# Patient Record
Sex: FEMALE | Race: BLACK OR AFRICAN AMERICAN | Employment: FULL TIME | URBAN - METROPOLITAN AREA
[De-identification: names, ages, dates, MRNs, and addresses within clinical notes are randomized per-mention and may not be internally consistent; named-entity substitution may affect disease eponyms.]

---

## 2017-07-25 ENCOUNTER — HOSPITAL ENCOUNTER (OUTPATIENT)
Dept: MAMMOGRAPHY | Age: 62
Discharge: HOME | End: 2017-07-25
Attending: NURSE PRACTITIONER
Payer: MEDICAID

## 2017-07-25 DIAGNOSIS — Z12.31 VISIT FOR SCREENING MAMMOGRAM: ICD-10-CM

## 2017-07-25 PROCEDURE — 77067 SCR MAMMO BI INCL CAD: CPT

## 2018-07-27 ENCOUNTER — HOSPITAL ENCOUNTER (OUTPATIENT)
Dept: MAMMOGRAPHY | Age: 63
Discharge: HOME | End: 2018-07-27
Attending: INTERNAL MEDICINE
Payer: MEDICAID

## 2018-07-27 DIAGNOSIS — Z12.39 SCREENING FOR BREAST CANCER: ICD-10-CM

## 2018-07-27 PROCEDURE — 77067 SCR MAMMO BI INCL CAD: CPT

## 2019-07-29 ENCOUNTER — HOSPITAL ENCOUNTER (OUTPATIENT)
Dept: MAMMOGRAPHY | Age: 64
Discharge: HOME | End: 2019-07-29
Attending: INTERNAL MEDICINE
Payer: MEDICAID

## 2019-07-29 DIAGNOSIS — Z12.31 VISIT FOR SCREENING MAMMOGRAM: ICD-10-CM

## 2019-07-29 PROCEDURE — 77067 SCR MAMMO BI INCL CAD: CPT

## 2019-08-14 ENCOUNTER — HOSPITAL ENCOUNTER (OUTPATIENT)
Dept: ULTRASOUND IMAGING | Age: 64
Discharge: HOME | End: 2019-08-14
Attending: INTERNAL MEDICINE
Payer: MEDICAID

## 2019-08-14 ENCOUNTER — HOSPITAL ENCOUNTER (OUTPATIENT)
Dept: MAMMOGRAPHY | Age: 64
Discharge: HOME | End: 2019-08-14
Attending: INTERNAL MEDICINE
Payer: MEDICAID

## 2019-08-14 DIAGNOSIS — R92.2 INCONCLUSIVE MAMMOGRAM: ICD-10-CM

## 2019-08-14 DIAGNOSIS — R92.8 ABNORMALITY OF RIGHT BREAST ON SCREENING MAMMOGRAM: ICD-10-CM

## 2019-08-14 PROCEDURE — 76642 ULTRASOUND BREAST LIMITED: CPT

## 2019-08-14 PROCEDURE — 77061 BREAST TOMOSYNTHESIS UNI: CPT

## 2020-07-31 ENCOUNTER — HOSPITAL ENCOUNTER (OUTPATIENT)
Dept: MAMMOGRAPHY | Age: 65
Discharge: HOME | End: 2020-07-31
Payer: MEDICAID

## 2020-07-31 DIAGNOSIS — Z12.31 ENCOUNTER FOR SCREENING MAMMOGRAM FOR BREAST CANCER: ICD-10-CM

## 2020-07-31 PROCEDURE — 77063 BREAST TOMOSYNTHESIS BI: CPT

## 2020-08-17 ENCOUNTER — HOSPITAL ENCOUNTER (OUTPATIENT)
Dept: GENERAL RADIOLOGY | Age: 65
Discharge: HOME | End: 2020-08-17
Payer: MEDICAID

## 2020-08-17 DIAGNOSIS — R05.8 PRODUCTIVE COUGH: ICD-10-CM

## 2020-08-17 PROCEDURE — 71046 X-RAY EXAM CHEST 2 VIEWS: CPT

## 2020-09-28 ENCOUNTER — TRANSCRIBE ORDER (OUTPATIENT)
Dept: SCHEDULING | Age: 65
End: 2020-09-28

## 2020-09-28 DIAGNOSIS — M81.0 SENILE OSTEOPOROSIS: Primary | ICD-10-CM

## 2020-10-08 ENCOUNTER — HOSPITAL ENCOUNTER (OUTPATIENT)
Dept: GENERAL RADIOLOGY | Age: 65
Discharge: HOME | End: 2020-10-08
Attending: NURSE PRACTITIONER
Payer: MEDICAID

## 2020-10-08 DIAGNOSIS — M81.0 SENILE OSTEOPOROSIS: ICD-10-CM

## 2020-10-08 PROCEDURE — 77080 DXA BONE DENSITY AXIAL: CPT

## 2021-08-17 ENCOUNTER — HOSPITAL ENCOUNTER (OUTPATIENT)
Dept: WOMENS IMAGING | Age: 66
Discharge: HOME | End: 2021-08-17
Attending: NURSE PRACTITIONER
Payer: MEDICAID

## 2021-08-17 DIAGNOSIS — Z12.31 VISIT FOR SCREENING MAMMOGRAM: ICD-10-CM

## 2021-08-17 PROCEDURE — 77063 BREAST TOMOSYNTHESIS BI: CPT

## 2022-01-07 ENCOUNTER — HOSPITAL ENCOUNTER (OUTPATIENT)
Dept: PHYSICAL THERAPY | Age: 67
Discharge: HOME | End: 2022-01-07
Payer: MEDICARE

## 2022-01-07 PROCEDURE — 97162 PT EVAL MOD COMPLEX 30 MIN: CPT

## 2022-01-07 NOTE — PROGRESS NOTES
[address] PHYSICAL THERAPY  [address] [facility] [address], [place], [address] - Phone: [phone]  Fax: [phone] / [address] [facility]  Patient Name: Arsalan Mora : 1955   Medical   Diagnosis: Other low back pain [M54.59] Treatment Diagnosis: Lumbar Radiculopathy   Onset Date: >6 months ago     Referral Source: Janey Saunders MD Start of Care [facility]): 2022   Prior Hospitalization: See medical history Provider #: [id number]   Prior Level of Function: Functionally independent and pain free   Comorbidities: HTN, overweight   Medications: Verified on Patient Summary List   The Plan of Care and following information is based on the information from the initial evaluation.   ===========================================================================================  Assessment / key information: Patient is a 77 y.o. female presenting to clinic with CC left sided LBP that intermittently radiates tingling/burning sensation to her left LE. Symptoms were insidous onset in nature, and she now presents with postural deformity upon rise. She demonstrates a FTSS score of 22 seconds, indicating increased risk for falls. LQS unremarkable, though MDT screening demonstrates ability to centralize all symptoms. The patient will benefit from physical therapist management to address her impairments (listed below),  educate her, and improve her level of function.  Thanks for your referral.   ===========================================================================================  Eval Complexity: History: MEDIUM  Complexity : 1-2 comorbidities / personal factors will impact the outcome/ POC Exam:MEDIUM Complexity : 3 Standardized tests and measures addressing body structure, function, activity limitation and / or participation in recreation  Presentation: MEDIUM Complexity : Evolving with changing characteristics  Clinical Decision Making:MEDIUM Complexity : FOTO score of 26-74Overall Complexity:MEDIUM  Problem List: pain affecting function, decrease ROM, decrease strength, impaired gait/ balance, decrease ADL/ functional abilitiies, decrease activity tolerance, decrease flexibility/ joint mobility and decrease transfer abilities  FOTO score: 56 indicating 44% functional disability   Treatment Plan may include any combination of the following: Therapeutic exercise, Therapeutic activities, Neuromuscular re-education, Physical agent/modality, Gait/balance training, Manual therapy, Aquatic therapy, Patient education, Self Care training, Functional mobility training, Home safety training and Stair training  Patient / Family readiness to learn indicated by: asking questions, trying to perform skills and interest  Persons(s) to be included in education: patient (P)  Barriers to Learning/Limitations: None  Measures taken: n/a   Patient Goal (s): \"Less Pain\"   Patient self reported health status: good  Rehabilitation Potential: excellent   Short Term Goals: To be accomplished in  3  weeks:  1. Patient to be adherent to HEP to facilitate pain control with ADL's  2. Patient to centralize left LE sx to level of L/S to demonstrate effectiveness of directional preference exercises and decrease risk of LE dysfunction  3. Patient to report > 50% improvement in sleep interrupted by LBP. 4. Patient to demonstrate no postural deformity upon rise.  Long Term Goals: To be accomplished in  6  weeks:  1. Patient to be Safe and Independent with HEP to self-manage/prevent symptoms after DC. 2. Patient to increase FS FOTO score to > 62 to indicate increased functional independence. 3. Patient to demonstrate FTSS score of < 18\" to indicate reduced risk for falls. Frequency / Duration:   Patient to be seen  2  times per week for 6  weeks:  Patient / Caregiver education and instruction: activity modification and exercises. We reviewed our facility's Patient Personal Responsibilities (PPR) form, particularly in regards to compliance towards her appointment time, our attendance policy, and her home exercise program. Patient was informed of possible discharge for non-compliance to our attendance policy per PPR form. We also discussed her POC as deemed appropriate by the treating therapist and physician. Patient verbalized understanding that she must show objective and functional improvement in an appropriate time frame. Patient verbalized understanding that should progress or compliance be lacking, we will contact the referring physician for further consultation to address and attempt to establish alternate treatment strategies as necessary and/or possibly discharge. Therapist Signature: Naman Sepulveda \"BJ\" Bello Baez, TATIANAT, Cert. MDT, Cert. DN, Cert. SMT, Dip. Osteopractic Date: 6/2/2557   Certification Period: n/a Time: 10:56 AM   ===========================================================================================  I certify that the above Physical Therapy Services are being furnished while the patient is under my care. I agree with the treatment plan and certify that this therapy is necessary. Physician Signature:        Date:       Time:                                        Colletta Conners, MD  Please sign and return to [employer] or you may fax the signed copy to [phone]. Thank you.

## 2022-01-07 NOTE — PROGRESS NOTES
PHYSICAL THERAPY - DAILY TREATMENT NOTE  Patient Name: Lee Zafar        Date: 2022  : 1955   [x]  Patient  Verified  Visit #:   1     Insurance: Payor: [employer] MEDICAID / Plan: [facility] HE[employer]KEEPERS PLUS / Product Type: Managed Care Medicaid /      In time:   10:00          Out time:   10:45 Total Treatment Time (min):   45   Medicare Time Tracking (below)   Total Timed Codes (min):  0 1:1 Treatment Time:  0     SUBJECTIVE    Pain Level (on 0 to 10 scale):  5  / 10   Medication Changes/New allergies or changes in medical history, any new surgeries or procedures? []  No    []  Yes   If yes, update Summary List:    Subjective Functional Status/Changes:  []  No changes reported       HISTORY    Present Symptoms: LBP, Left side.  Has experienced tingling to left thigh    Present since:  >6 months  [] Improving []  Unchanging [x]  Worsening        Commenced as a result of:    or  [x]  No apparent reason  Started as a spasm, then became tingling, now a burning sensation    Symptoms at onset:  [x] Back []  Thigh []  Leg     Constant symptoms:  [x] Back []  Thigh []  Leg       Intermittent symptoms:  [] Back [x]  Thigh []  Leg   Itching sensation    Worse:  [x] Bending []  Walking []  Lying   [x] Sitting/ Rising [x]  Standing [] When still   []  Am/ as the day progresses/ pm []  On the move []Other:     Other:      Better:   [] Bending [x]  Walking []  Lying   [] Sitting/ Rising []  Standing [] When still   []  Am/ as the day progresses/ pm [x]  On the move []Other:       Other:    Disturbed sleep: [] No    [x] Yes       Sleeping Postures:  []  Prone [x]  Supine   []  R side [x] L side    [] Toss and Turn [] OOB             Current Treatment: motrin    Number of previous episodes: none      Previous Pertinent Medical History: HTN    Previous Treatments: medication    SPECIFIC QUESTIONS    [] Cough []  Deep breath [x]  -ve   [] Sneeze []  + ve      Bladder:  [x] Normal []  Abnormal Gait:  [] Normal [x]  Abnormal     General Health:  [] Good [x]  Fair []  Poor     Imaging:   [] Yes [x]  No       Results if yes:n/a    Night pain:   [] Yes [x]  No     Recent or major surgery:  [] Yes [x]  No     Accidents/Falls:  [] Yes [x]  No     Unexplained weight loss:  [] Yes [x]  No     Work:  Mechanical Stresses: sitting    Leisure: Mechanical Stresses: sedentary     Functional disability from present episode: postural deformity upon rise, limited ADL tolreance     Functional disability score- See FS FOTO score       OBJECTIVE  EXAMINATION  Posture:  Sitting  [] Good []  Fair [x]  Poor     Standing:  [] Good [x]  Fair []  Poor     Lordosis:  [] Red []  Acc []  Normal       Lateral shift:  [] Right []  Left [x]  Nil     Correction of posture:  [x] Better [] Worse []  No effect     Other observations:      Neurological:    Myotome Level Muscles Nerve Reflex Sensation Action   L1-L3 Iliopsoas T12/L1-3  Quadriceps L2-4  Adductors L2-L4 (Iliacus)- Femoral  Femoral  Obturator/Sciatic N/A  L3-4 = Patella L1- Inguinal Crease  L2- Anterior Thigh  L3- Anterior Thigh above knee Hip Flexion  Knee Extension   L4 Tibialis anterior L4&5   Deep Peroneal Patella Anterior Knee Suprapatellar Ankle Dorsiflexion   L5 Extensor Hallucis Longus  Extensor Digitorum Lungus  Gluteus Medius Deep Peroneal         Superior Gluteal None Reliable Dorsum of Foot/Great Toe  Anterior Shank Extensor  to Great Toe        Hip Internal Rotation   S1 Peroneus Longus  Gastrocnemius & Soleus  Gluteus Ronnell Superficial Peroneal  Tibial    Inferior Gluteal Achilles Tendon Lateral Shank around Lateral Malleolus  Lateral Aspect/Dorsum of GT   Plantar Flexion      Hip Extension   Notable findings from above: unremarkable  Dural signs: Cushing String:       [x] R  [] +    [] -    [] L    [] +    [] -      ASLR:              [x] R  [] +    [] -    [x] L    [] +    [] -  0-35= no dural movement, tension onset to sciatic roots at 35 degrees.  Sciatic roots tense over intervertebral discs 35-70 degrees. > 70 degrees practically no further deformation of roots. Bragard's Sign [x] R  [] +    [] -    [x] L    [] +    [] - (Take off flexion, then dorsiflex)      Salazar's Sign   R  [x] +    [] -          [x] L    [] +    [] - (Take off flexion, then have patient flex neck)    Slump test :       [x] R   [] +    [] -    [x] L    [] +    [] - (sensitivity 44-70%; specificity 23-66%)    L/S Movement loss Raji Mod Min Nil Pain   Flexion   x  Left L/S   Extension  x   C   Side Gliding R    x NE   Side Gliding L    x Left L/S       Test movements:  Describe effects on present pain- During: produces, abolishes, increases, decreases, no effect, centralising, peripheralising. After: better, worse, no better, no worse, no effect, centralised, peripheralised.     Able to C with repeated extensions    Other Tests:    [place]:  Gaenslen's: [x] R    [] +    [x] -   [x] L    [] +    [x] -     Compression: [x] R    [] +    [x] -    [x] L    [] +    [x] -      Gapping/Distraction:  [] +    [x] -     Thigh Thrust: [x] R [] +    [x] -    [x] L    [] +    [x] -     Sacral Thrust  [x] R [] +    [x] -    [x] L    [] +    [x] -          Hip: [employer]:  [x] R [] +    [x] -    [x] L    [] +    [][x] -     Scour:  [x] R [] +    [x] -    [x] L    [] +     -     Piriformis: [x] R [] +    [x] -    [x] L    [] +    [] -          Deficits: Hamstrings 90/90[de-identified] [x] R [] +    [x] -    [x] L    [] +    [x] -     Robb:    [x] R  [] +    [x] -   [x] L    [] +    [x] -     Post Treatment Pain Level (on 0 to 10) scale:   3C  / 10     ASSESSMENT      min Patient Education:  YES  Reviewed HEP   []  Progressed/Changed HEP based on:          ASSESSMENT    Assessment  Justification for Eval Code Complexity:  Patient History (low 0, mod 1-2, high 3-4): mod  Examination (low 1-2, mod 3+, high 4+): mod  Clinical Presentation (low stable or uncomplicated, mod evolving or changing, high unstable or unpredictable): mod  Clinical Decision Making (low , mod 26-74, high 1-25): FOTO mod      []  See Progress Note/Recertification   Patient will continue to benefit from skilled PT services to : SEE POC   Progress toward goals / Updated goals:    See POC     PLAN    []  Upgrade activities as tolerated YES Continue plan of care   []  Discharge due to :    [x]  Other: Initiate POC     Therapist: Sherron Bhagat \"BJ\" Alejandro Solorzano, DPT, Cert. MDT, Cert. DN, Cert.  SMT    Date: 1/7/2022 Time: 10:11 AM     Future Appointments   Date Time Provider Syed Brownlee   1/7/2022 10:15 AM Melinda Daniel, PT [facility]

## 2022-01-17 ENCOUNTER — HOSPITAL ENCOUNTER (OUTPATIENT)
Dept: PHYSICAL THERAPY | Age: 67
Discharge: HOME | End: 2022-01-17
Payer: MEDICARE

## 2022-01-17 PROCEDURE — 97110 THERAPEUTIC EXERCISES: CPT

## 2022-01-17 PROCEDURE — 97530 THERAPEUTIC ACTIVITIES: CPT

## 2022-01-17 NOTE — PROGRESS NOTES
PHYSICAL THERAPY - DAILY TREATMENT NOTE    Patient Name: Ruth Ann Long        Date: 2022  : 1955   yes Patient  Verified  Visit #:   2     Insurance: Payor: Katheryn Browne / Plan[place] & [place] / Product Type: Medicare /      In time: 336 Out time: 664   Total Treatment Time: 56     Medicare/BCBS Time Tracking (below)   Total Timed Codes (min):  46 1:1 Treatment Time:  46     TREATMENT AREA =  Other low back pain [M54.59]    SUBJECTIVE  Pain Level (on 0 to 10 scale):  5  / 10   Medication Changes/New allergies or changes in medical history, any new surgeries or procedures?    no  If yes, update Summary List   Subjective Functional Status/Changes:  []  No changes reported     \"I realize that I do need to work on sitting up better\"          OBJECTIVE  Modalities Rationale:     decrease pain to improve patient's ability to safely perform ADLs, bending/stooping/ lifting; perform prolong sitting/standing/ambulation; and negotiate stairs with no pain or limitations    min [] Estim, type/location:                                      []  att     []  unatt     []  w/US     []  w/ice    []  w/heat    min []  Mechanical Traction: type/lbs                   []  pro   []  sup   []  int   []  cont    []  before manual    []  after manual    min []  Ultrasound, settings/location:      min []  Iontophoresis w/ dexamethasone, location:                                               []  take home patch       []  in clinic   10 min []  Ice     [x]  Heat    location/position: Supine with wedge under B LEs   [x]Skin assessment post-treatment (if applicable):   [x]  intact    []  redness- no adverse reaction                  []redness  adverse reaction:      36 min Therapeutic Exercise:  [x]  See flow sheet   Rationale:      increase ROM, increase strength and improve coordination to improve the patients ability to safely perform ADLs, bending/stooping/ lifting; perform prolong sitting/standing/ambulation; and negotiate stairs with no pain or limitations        10 min Therapeutic Activity: [x]  See flow sheet   Rationale:    increase ROM, increase strength and improve coordination to improve the patients ability to  perform transfers and bed mobility without difficulty or pain      Billed With/As:   [x] TE   [x] TA   [] Neuro   [] Self Care Patient Education: [x] Review HEP    [] Progressed/Changed HEP based on:   [x] positioning   [x] body mechanics   [x] transfers   [] heat/ice application    [x] other: Pt ed on importance and benefits of compliance with HEP, core strength/stability and proper posture; pt verbalized understanding    See updated HEP in chart     Other Objective/Functional Measures:    VCs + demo to perform proper technique for TE  Initiated TE per flowsheet;  no c/o pain noted at this time  Reviewed proper bed mobility, sleeping positions, lifting techniques and importance and benefits of a neutral spine  demos decrease RLE WBing with sit <>std without UE, min improvement with VCing due to knee pain     Post Treatment Pain Level (on 0 to 10) scale:   0  / 10     ASSESSMENT  Assessment/Changes in Function:   Progressed TE without c/o increase p!  demos proper log roll>sup>sit with instruction     []  See Progress Note/Recertification   Patient will continue to benefit from skilled PT services to modify and progress therapeutic interventions, address functional mobility deficits, address ROM deficits, address strength deficits, analyze and address soft tissue restrictions, analyze and cue movement patterns, analyze and modify body mechanics/ergonomics, assess and modify postural abnormalities and instruct in home and community integration to attain remaining goals.    Progress toward goals / Updated goals:    Pt's [facility], no noted progress        PLAN  [x]  Upgrade activities as tolerated yes Continue plan of care   []  Discharge due to :    []  Other:      Therapist: Lupe Huang, PTA Date: 1/17/2022 Time: 4:48 PM     Future Appointments   Date Time Provider Syed Brownlee   1/19/2022  3:30 PM [facility]   1/24/2022  3:30 PM Christel Brinks [facility]   1/26/2022  3:30 PM [facility]   1/31/2022  3:30 PM Albert Delatorre PT [facility]

## 2022-01-19 ENCOUNTER — APPOINTMENT (OUTPATIENT)
Dept: PHYSICAL THERAPY | Age: 67
End: 2022-01-19
Payer: MEDICARE

## 2022-01-24 ENCOUNTER — HOSPITAL ENCOUNTER (OUTPATIENT)
Dept: PHYSICAL THERAPY | Age: 67
Discharge: HOME | End: 2022-01-24
Payer: MEDICARE

## 2022-01-24 PROCEDURE — 97112 NEUROMUSCULAR REEDUCATION: CPT

## 2022-01-24 PROCEDURE — 97530 THERAPEUTIC ACTIVITIES: CPT

## 2022-01-24 PROCEDURE — 97116 GAIT TRAINING THERAPY: CPT

## 2022-01-24 PROCEDURE — 97110 THERAPEUTIC EXERCISES: CPT

## 2022-01-24 NOTE — PROGRESS NOTES
PHYSICAL THERAPY - DAILY TREATMENT NOTE    Patient Name: Renetta Jenkins        Date: 2022  : 1955   yes Patient  Verified  Visit #:   3     Insurance: Payor: Rudolph Ceciliaaguila / Plan: [place] PART A & B / Product Type: Medicare /      In time: 601 Out time: 733   Total Treatment Time: 58     Medicare/BCBS Time Tracking (below)   Total Timed Codes (min):  48 1:1 Treatment Time:  48     TREATMENT AREA =  Other low back pain [M54.59]    SUBJECTIVE  Pain Level (on 0 to 10 scale): 4  / 10   Medication Changes/New allergies or changes in medical history, any new surgeries or procedures?    no  If yes, update Summary List   Subjective Functional Status/Changes:  []  No changes reported     \"I am going to be honest, I have not been compliant with my HEP. I am bad. I will.  My son was sick all last week\"         OBJECTIVE  Modalities Rationale:     decrease pain to improve patient's ability to safely perform ADLs, bending/stooping/ lifting; perform prolong sitting/standing/ambulation; and negotiate stairs with no pain or limitations    min [] Estim, type/location:                                      []  att     []  unatt     []  w/US     []  w/ice    []  w/heat    min []  Mechanical Traction: type/lbs                   []  pro   []  sup   []  int   []  cont    []  before manual    []  after manual    min []  Ultrasound, settings/location:      min []  Iontophoresis w/ dexamethasone, location:                                               []  take home patch       []  in clinic   10 min []  Ice     [x]  Heat    location/position: prone with wedge under B LEs   [x]Skin assessment post-treatment (if applicable):   [x]  intact    []  redness- no adverse reaction                  []redness  adverse reaction:      11 min Therapeutic Exercise:  [x]  See flow sheet   Rationale:      increase ROM, increase strength and improve coordination to improve the patients ability to safely perform ADLs, bending/stooping/ lifting; perform prolong sitting/standing/ambulation; and negotiate stairs with no pain or limitations        10 min Therapeutic Activity: [x]  See flow sheet   Rationale:    increase ROM, increase strength and improve coordination to improve the patients ability to  perform transfers, stair training, prolong amb and bed mobility without difficulty or pain    12 min Neuromuscular Re-ed: [x]  See flow sheet   Rationale:    increase ROM, increase strength and improve coordination to improve the patients ability to safely perform ADLs, bending/stooping/ lifting; perform prolong sitting/standing/ambulation; and negotiate stairs with no pain or limitations       15 min Gait Training: CGA > SBA for HK amb x 60'   (I) with suitcase carry x 60' ea UE with 5#   (I) with SS x 30' x 2    Rationale:increase ROM, increase strength and improve coordination to improve the patients ability to safely perform prolong ambulation      Billed With/As:   [x] TE   [x] TA   [] Neuro   [] Self Care Patient Education: [x] Review HEP    [] Progressed/Changed HEP based on:   [x] positioning   [x] body mechanics   [x] transfers   [] heat/ice application    [x] other: Pt ed on importance and benefits of compliance with HEP, core strength/stability and proper posture; pt verbalized understanding  issued OTB for HEP     Other Objective/Functional Measures:    VCs + demo to perform proper technique for TE  Initiated HK and SS amb, and HL hip add/abd;  no c/o pain noted at this time  c/o fatigue after performing sit <>std without UE x 10 without producing pain     Post Treatment Pain Level (on 0 to 10) scale:   0  / 10     ASSESSMENT  Assessment/Changes in Function:   demos symmetrical WBIng with sit <>stand without UE asisst  demos upright posture on DD x 10'', continues to lack core strength   []  See Progress Note/Recertification   Patient will continue to benefit from skilled PT services to modify and progress therapeutic interventions, address functional mobility deficits, address ROM deficits, address strength deficits, analyze and address soft tissue restrictions, analyze and cue movement patterns, analyze and modify body mechanics/ergonomics, assess and modify postural abnormalities and instruct in home and community integration to attain remaining goals. Progress toward goals / Updated goals:  Short Term Goals: To be accomplished in  3  weeks from 1/7/22:  1. Patient to be adherent to HEP to facilitate pain control with ADL's. Established HEP  2. Patient to centralize left LE sx to level of L/S to demonstrate effectiveness of directional preference exercises and decrease risk of LE dysfunction. intermittent sxs  3. Patient to report > 50% improvement in sleep interrupted by LBP. 4. Patient to demonstrate no postural deformity upon rise. · Long Term Goals: To be accomplished in  6  weeks:  1. Patient to be Safe and Independent with HEP to self-manage/prevent symptoms after DC. 2. Patient to increase FS FOTO score to > 62 to indicate increased functional independence. 3. Patient to demonstrate FTSS score of < 18\" to indicate reduced risk for falls.      PLAN  [x]  Upgrade activities as tolerated yes Continue plan of care   []  Discharge due to :    []  Other:      Therapist: Radha Rogers PTA    Date: 1/24/2022 Time: 5:29 PM     Future Appointments   Date Time Provider Syed Brownlee   1/26/2022  3:30 PM Suezanne Province [facility]   1/31/2022  3:30 PM Alejandrina Licona PT [facility]

## 2022-01-26 ENCOUNTER — HOSPITAL ENCOUNTER (OUTPATIENT)
Dept: PHYSICAL THERAPY | Age: 67
Discharge: HOME | End: 2022-01-26
Payer: MEDICARE

## 2022-01-26 PROCEDURE — 97530 THERAPEUTIC ACTIVITIES: CPT

## 2022-01-26 PROCEDURE — 97116 GAIT TRAINING THERAPY: CPT

## 2022-01-26 PROCEDURE — 97112 NEUROMUSCULAR REEDUCATION: CPT

## 2022-01-26 PROCEDURE — 97110 THERAPEUTIC EXERCISES: CPT

## 2022-01-26 NOTE — PROGRESS NOTES
PHYSICAL THERAPY - DAILY TREATMENT NOTE    Patient Name: Niraj Strickland        Date: 2022  : 1955   yes Patient  Verified  Visit #:   3     Insurance: Payor: Mary Kate Beth / Plan: [place] PART [place] & B / Product Type: Medicare /      In time: 771 Out time: 944   Total Treatment Time: 58     Medicare/BCBS Time Tracking (below)   Total Timed Codes (min):  48 1:1 Treatment Time:  48     TREATMENT AREA =  Other low back pain [M54.59]    SUBJECTIVE  Pain Level (on 0 to 10 scale): 4  / 10 L/s, 7/10 B LEs   Medication Changes/New allergies or changes in medical history, any new surgeries or procedures?    no  If yes, update Summary List   Subjective Functional Status/Changes:  []  No changes reported     \"I feel it in my legs today. I work up that way. My back isnt as bad. The legs are bad.  I did my stretches a couple times\"       OBJECTIVE  Modalities Rationale:     decrease pain to improve patient's ability to safely perform ADLs, bending/stooping/ lifting; perform prolong sitting/standing/ambulation; and negotiate stairs with no pain or limitations    min [] Estim, type/location:                                      []  att     []  unatt     []  w/US     []  w/ice    []  w/heat    min []  Mechanical Traction: type/lbs                   []  pro   []  sup   []  int   []  cont    []  before manual    []  after manual    min []  Ultrasound, settings/location:      min []  Iontophoresis w/ dexamethasone, location:                                               []  take home patch       []  in clinic   10 min []  Ice     [x]  Heat    location/position: prone with wedge under B LEs   [x]Skin assessment post-treatment (if applicable):   [x]  intact    []  redness- no adverse reaction                  []redness  adverse reaction:      16  min Therapeutic Exercise:  [x]  See flow sheet   Rationale:      increase ROM, increase strength and improve coordination to improve the patients ability to safely perform ADLs, bending/stooping/ lifting; perform prolong sitting/standing/ambulation; and negotiate stairs with no pain or limitations        10 min Therapeutic Activity: [x]  See flow sheet   Rationale:    increase ROM, increase strength and improve coordination to improve the patients ability to  perform transfers, stair training, prolong amb and bed mobility without difficulty or pain    12 min Neuromuscular Re-ed: [x]  See flow sheet   Rationale:    increase ROM, increase strength and improve coordination to improve the patients ability to safely perform ADLs, bending/stooping/ lifting; perform prolong sitting/standing/ambulation; and negotiate stairs with no pain or limitations       12 min Gait Training: (I) with Front rack carry x 61' with 5#  (I) with suitcase carry x 60' ea UE with 5#   (I) with SS x 30' x 2    Rationale:increase ROM, increase strength and improve coordination to improve the patients ability to safely perform prolong ambulation      Billed With/As:   [x] TE   [x] TA   [] Neuro   [] Self Care Patient Education: [x] Review HEP    [] Progressed/Changed HEP based on:   [x] positioning   [x] body mechanics   [x] transfers   [] heat/ice application    [x] other: Pt ed on importance and benefits of compliance with HEP, core strength/stability and proper posture; pt verbalized understanding  issued OTB for HEP     Other Objective/Functional Measures:    VCs + demo to perform proper technique for TE  Initiated front rack carry, and bridges without producing pain  simulated car transfers with sit <>std without UE to promote proper techn and eliminate twisting  ART-B/B decrease pain and radic sxs and decrease antalgic gait  instructed to perform every 1-2 hr x 10 to manage sxs; pt verbalized understanding     Post Treatment Pain Level (on 0 to 10) scale:  4  / 10     ASSESSMENT  Assessment/Changes in Function:   ART - reduced pain to 4/10 in LEs  demos fair bridge form  achieved STG #4   []  See Progress Note/Recertification   Patient will continue to benefit from skilled PT services to modify and progress therapeutic interventions, address functional mobility deficits, address ROM deficits, address strength deficits, analyze and address soft tissue restrictions, analyze and cue movement patterns, analyze and modify body mechanics/ergonomics, assess and modify postural abnormalities and instruct in home and community integration to attain remaining goals. Progress toward goals / Updated goals:  Short Term Goals: To be accomplished in  3  weeks from 1/7/22:  1. Patient to be adherent to HEP to facilitate pain control with ADL's. Established HEP  2. Patient to centralize left LE sx to level of L/S to demonstrate effectiveness of directional preference exercises and decrease risk of LE dysfunction. intermittent sxs  3. Patient to report > 50% improvement in sleep interrupted by LBP. 4. Patient to demonstrate no postural deformity upon rise. MET  · Long Term Goals: To be accomplished in  6  weeks:  1. Patient to be Safe and Independent with HEP to self-manage/prevent symptoms after DC. 2. Patient to increase FS FOTO score to > 62 to indicate increased functional independence. 3. Patient to demonstrate FTSS score of < 18\" to indicate reduced risk for falls.      PLAN  [x]  Upgrade activities as tolerated yes Continue plan of care   []  Discharge due to :    []  Other:      Therapist: Claudette Paz PTA    Date: 1/26/2022 Time: 5:29 PM     Future Appointments   Date Time Provider Syed Brownlee   1/31/2022  3:30 PM Melinda Daniel PT [facility]

## 2022-01-31 ENCOUNTER — HOSPITAL ENCOUNTER (OUTPATIENT)
Dept: PHYSICAL THERAPY | Age: 67
Discharge: HOME | End: 2022-01-31
Payer: MEDICARE

## 2022-01-31 PROCEDURE — 97110 THERAPEUTIC EXERCISES: CPT

## 2022-01-31 PROCEDURE — 97116 GAIT TRAINING THERAPY: CPT

## 2022-01-31 PROCEDURE — 97112 NEUROMUSCULAR REEDUCATION: CPT

## 2022-01-31 PROCEDURE — 97530 THERAPEUTIC ACTIVITIES: CPT

## 2022-01-31 NOTE — PROGRESS NOTES
PHYSICAL THERAPY - DAILY TREATMENT NOTE    Patient Name: Nola Ricardo        Date: 2022  : 1955   YES Patient  Verified  Visit #:   5     Insurance: Payor: Asad Batista / Plan: [place] MED[place] PART A & B / Product Type: Medicare /      In time: 3:21 Out time: 4:13   Total Treatment Time: 53       TREATMENT AREA = Other low back pain [M54.59]    SUBJECTIVE    Pain Level (on 0 to 10 scale):  5  / 10   Medication Changes/New allergies or changes in medical history, any new surgeries or procedures? NO    If yes, update Summary List   Subjective Functional Status/Changes:  []  No changes reported     \"I got a little in both my legs and back\"          OBJECTIVE     Therapeutic Procedures:  Min Procedure Specifics + Rationale   n/a [x]  Patient Education (performed throughout session) [x] Review HEP    [] Progressed/Changed HEP based on:   [] proper performance and advancement of Therex/TA   [] reduction in pain level    [] increased functional capacity       [] change in directional preference   14 [x] Therapeutic Exercise   [x]  See Flowsheet   Rationale: increase ROM and increase strength to improve the patients ability to participate in ADL's    11 [x]  Gait Training       HK/Retro/SS  Loaded carry with 10lb SC and goblet walk  Rationale: Normalize gait, increase proprioceptive and kinesthetic awareness, coordination, balance   15 [x] Therapeutic Activity   [x]  See Flowsheet    Rationale: To improve safety, proprioception, coordination, and efficiency with tasks   10 [x] Neuromuscular Re-ed   [x]  See Flowsheet  Performed ART/REIL throughout treatment b/w sets/reps/exercises as needed as prophylaxis and symptoms management. Rationale: increase ROM, increase strength, improve coordination, improve balance and increase proprioception  to improve the patients ability to safely participate in ADL's         Other Objective/Functional Measures:    Increased reps/sets/resistance per flow sheet. Post Treatment Pain Level (on 0 to 10) scale:   0  / 10     ASSESSMENT    Assessment/Changes in Function:       Performed familiar therex with minimal corrections indicating increased independence with HEP         Patient will continue to benefit from skilled PT services to modify and progress therapeutic interventions, address functional mobility deficits, address ROM deficits, address strength deficits, analyze and address soft tissue restrictions, analyze and cue movement patterns, analyze and modify body mechanics/ergonomics and instruct in home and community integration  to attain remaining goals   Progress toward goals / Updated goals:    Compliant to HEP     PLAN    [x]  Upgrade activities as tolerated  [x]  Update interventions per flow sheet YES Continue plan of care   []  Discharge due to :    []  Other:      Therapist: Anne Minor \"BJ\" [address], DPT, Donn Harvey [address]. MDT, Cert. DN, Cert. SMT, Dip.  Osteopractic    Date: 1/31/2022 Time: 2:50 PM     Future Appointments   Date Time Provider Syed Brownlee   1/31/2022  3:30 PM Bandar Monge, PT [facility]

## 2022-02-02 ENCOUNTER — HOSPITAL ENCOUNTER (OUTPATIENT)
Dept: PHYSICAL THERAPY | Age: 67
Discharge: HOME | End: 2022-02-02
Payer: MEDICARE

## 2022-02-02 PROCEDURE — 97530 THERAPEUTIC ACTIVITIES: CPT

## 2022-02-02 PROCEDURE — 97112 NEUROMUSCULAR REEDUCATION: CPT

## 2022-02-02 PROCEDURE — 97110 THERAPEUTIC EXERCISES: CPT

## 2022-02-02 NOTE — PROGRESS NOTES
PHYSICAL THERAPY - DAILY TREATMENT NOTE    Patient Name: Elza Gardner        Date: 2022  : 1955   YES Patient  Verified  Visit #:   6     Insurance: Payor: [employer]ICARE / Plan: [employer] [address] / Product Type: Managed Care Medicare /      In time: 3:20 Out time: 4:08   Total Treatment Time: 48     Medicare/BCBS Time Tracking (below)   Total Timed Codes (min):  48 1:1 Treatment Time:  38     TREATMENT AREA = Other low back pain [M54.59]    SUBJECTIVE    Pain Level (on 0 to 10 scale):  6  / 10   Medication Changes/New allergies or changes in medical history, any new surgeries or procedures? NO    If yes, update Summary List   Subjective Functional Status/Changes:  []  No changes reported     \"Not going to lie. I'm pretty exhausted and sore. \"          OBJECTIVE     Therapeutic Procedures:  Min Procedure Specifics + Rationale   n/a [x]  Patient Education (performed throughout session) [x] Review HEP    [] Progressed/Changed HEP based on:   [] proper performance and advancement of Therex/TA   [] reduction in pain level    [] increased functional capacity       [] change in directional preference   15 [x] Therapeutic Exercise   [x]  See Flowsheet   Rationale: increase ROM and increase strength to improve the patients ability to participate in ADL's    8 [x] Therapeutic Activity   [x]  See Flowsheet    Rationale:  To improve safety, proprioception, coordination, and efficiency with tasks   15 [x] Neuromuscular Re-ed   [x]  See Flowsheet    Rationale: increase ROM, increase strength, improve coordination, improve balance and increase proprioception  to improve the patients ability to safely participate in ADL's       Modality rationale: decrease inflammation, decrease pain, increase tissue extensibility and increase muscle contraction/control to improve the patients ability to perform ADL's with greater ease     Min Type Additional Details   10 [x]  Heat [] pre-ELIZA      [x] post-ELIZA Location:L/S    [x] supine             [] prone     [x] legs elevated  [] legs flat  [] sitting              [] sidelying - [] left [] right   [x] Skin assessment post-treatment:  [x]intact [x]redness- no adverse reaction       []redness  adverse reaction:       Other Objective/Functional Measures:    Limited performance due to report of overall fatigue      Post Treatment Pain Level (on 0 to 10) scale:   3  / 10     ASSESSMENT    Assessment/Changes in Function:       Gradual reduction in symptoms via therex despite higher level of pain         Patient will continue to benefit from skilled PT services to modify and progress therapeutic interventions, address functional mobility deficits, address ROM deficits, address strength deficits, analyze and address soft tissue restrictions, analyze and cue movement patterns, analyze and modify body mechanics/ergonomics and instruct in home and community integration  to attain remaining goals   Progress toward goals / Updated goals:    Slow progression of pain but increasing in functional strength     PLAN    [x]  Upgrade activities as tolerated  [x]  Update interventions per flow sheet YES Continue plan of care   []  Discharge due to :    []  Other:      Therapist: Earnestine Tello \"BJ\" Micaela, TATIANAT, Cert. MDT, Cert. DN, Cert. SMT, Dip.  Osteopractic    Date: 2/2/2022 Time: 3:42 PM     Future Appointments   Date Time Provider Syed Brownlee   2/7/2022  3:30 PM Estle Pica, PT [facility]   2/9/2022  3:30 PM Kerrie Jewel, PTA [facility]   2/14/2022  3:30 PM Kerrie Jewel, PTA [facility]   2/16/2022  3:30 PM Bianka Simpson [facility]   2/21/2022  3:30 PM Estle Pica, PT [facility]   2/23/2022  3:30 PM Estle Pica, PT [facility]   2/28/2022  3:30 PM Estle Pica, PT [facility]

## 2022-02-07 ENCOUNTER — HOSPITAL ENCOUNTER (OUTPATIENT)
Dept: PHYSICAL THERAPY | Age: 67
Discharge: HOME | End: 2022-02-07
Payer: MEDICARE

## 2022-02-07 PROCEDURE — 97530 THERAPEUTIC ACTIVITIES: CPT

## 2022-02-07 PROCEDURE — 97112 NEUROMUSCULAR REEDUCATION: CPT

## 2022-02-07 PROCEDURE — 97110 THERAPEUTIC EXERCISES: CPT

## 2022-02-07 NOTE — PROGRESS NOTES
PHYSICAL THERAPY - DAILY TREATMENT NOTE    Patient Name: Jeyson Vines        Date: 2022  : 1955   YES Patient  Verified  Visit #:     Insurance: Payor: [address] [place] [address] / Plan: [facility] [employer] MEDICARE COMPLETE / Product Type: Managed Care Medicare /      In time: 3:35 Out time: 4:28   Total Treatment Time: 53     Medicare/BCBS [place] Time Tracking (below)   Total Timed Codes (min):  43 1:1 Treatment Time:  43     TREATMENT AREA =  Other low back pain [M54.59]    SUBJECTIVE    Pain Level (on 0 to 10 scale):  6    / 10   Medication Changes/New allergies or changes in medical history, any new surgeries or procedures? NO    If yes, update Summary List   Subjective Functional Status/Changes:  []  No changes reported     Pt reports she hasn't had the pain in her left thigh in about a week, reports 60% improvement in left leg pain. Pt reports 70% improvement in her sleeping, stating she is now able to lay on her back and sides. Pt reports she still can't stand for longer than 5 minutes before her back starts to hurt, she has been doing the back extensions in standing to help manage her back pain. Pt states the pain stays off to the left side of her back.          OBJECTIVE    Modalities Rationale:decrease inflammation, decrease pain, increase tissue extensibility and increase muscle contraction/control to improve the patients ability to perform ADL's with greater ease   min [] Estim, type/location:                                      []  att     []  unatt     []  w/US     []  w/ice    []  w/heat    min []  Mechanical Traction: type/lbs                   []  pro   []  sup   []  int   []  cont    []  before manual    []  after manual    min []  Ultrasound, settings/location:      min []  Iontophoresis w/ dexamethasone, location:                                               []  take home patch       []  in clinic   10 min []  Ice     [x]  Heat    location/position: [facility] to L/S, Patient supine with LE wedge    min []  Vasopneumatic Device, press/temp:     min []  Other:    [x] Skin assessment post-treatment (if applicable):    [x]  intact    []  redness- no adverse reaction     []redness  adverse reaction:    herapeutic Procedures:  Min Procedure Specifics + Rationale   18 [x] Therapeutic Exercise   [x]  See Flowsheet   Rationale: increase ROM and increase strength to improve the patients ability to participate in ADL's    13 [x] Therapeutic Activity   [x]  See Flowsheet    Rationale: To improve safety, proprioception, coordination, and efficiency with tasks   12 [x] Neuromuscular Re-ed   [x]  See Flowsheet    Rationale: increase ROM, increase strength, improve coordination, improve balance and increase proprioception  to improve the patients ability to safely participate in ADL's       min Patient Education:  YES  Reviewed HEP   []  Progressed/Changed HEP based on:   Pt reports compliance with HEP     Other Objective/Functional Measures: Mod VCing for form with therapeutic exercise. Pt declines prone. Advised pt to trial prone at home in her bed to assess response. Pt requires min VCing to maintain neutral spine with sit<>stand transfer. Post Treatment Pain Level (on 0 to 10) scale:   4  / 10     ASSESSMENT    Assessment/Changes in Function:     Pt reports decreased frequency of left LE radicular symptoms. [x]  See Progress Note/Recertification   Patient will continue to benefit from skilled PT services to modify and progress therapeutic interventions, address functional mobility deficits, address ROM deficits, address strength deficits, analyze and address soft tissue restrictions, analyze and cue movement patterns, assess and modify postural abnormalities, and instruct in home and community integration to attain remaining goals. Progress toward goals / Updated goals: · Short Term Goals: To be accomplished in  3  weeks:  1.  Patient to be adherent to HEP to facilitate pain control with ADL's MET  2. Patient to centralize left LE sx to level of L/S to demonstrate effectiveness of directional preference exercises and decrease risk of LE dysfunction MET  3. Patient to report > 50% improvement in sleep interrupted by LBP. MET  4. Patient to demonstrate no postural deformity upon rise.  Min VCing      PLAN    []  Upgrade activities as tolerated YES Continue plan of care   []  Discharge due to :    []  Other:      Therapist: Geovanna Higginbotham PTA    Date: 2/7/2022 Time: 6:12 PM     Future Appointments   Date Time Provider Syed Brownlee   2/9/2022  3:30 PM Melton Jaegers [facility]   2/14/2022  3:30 PM Teodoro Wagner PTA [facility]   2/16/2022  3:30 PM Melton Jaegers [facility]   2/21/2022  3:30 PM Katlyn Ordaz, PT [facility]   2/23/2022  3:30 PM Katlyn Ordaz, PT [facility]   2/28/2022  3:30 PM Katlyn Ordaz, PT [facility]

## 2022-02-08 NOTE — PROGRESS NOTES
[address] [facility] PHYSICAL THERAPY  [address] [facility] [address], [place], [address] - Phone: [phone]  Fax: [phone]  PROGRESS NOTE  Patient Name: Jeyson Vines : 1955   Treatment/Medical Diagnosis: Other low back pain [M54.59]   Referral Source: Mehdi Gaytan MD     Date of Initial Visit: 22 Attended Visits: 7 Missed Visits: 1     SUMMARY OF TREATMENT  Patient's treatment has consisted of extension based exercise for management of LE radiculopathy, core stabilization, LE strengthening and ROM exercises, postural education and instruction in home exercise program.     CURRENT STATUS  Pt subjectively reports 60% improvement in her left LE radicular symptoms, reporting no radiculopathy in the past week. Pt's radicular symptoms are centralizing to left L/S with extension based movement. Pt also reports improved tolerance to side lying for sleeping. Pt continues with c/o increased LBP with standing and states she is only able to stand for 5 minutes before symptoms begin. Goal/Measure of Progress Goal Met?   1.  1. Patient to be adherent to HEP to facilitate pain control with ADL's   Status at last Eval: HEP issued Current Status: Pt compliant with HEP yes   2.  2. Patient to centralize left LE sx to level of L/S to demonstrate effectiveness of directional preference exercises and decrease risk of LE dysfunction   Status at last Eval: left LE radiculopathy Current Status: centralizing with extension  yes   3.  3. Patient to report > 50% improvement in sleep interrupted by LBP. Status at last Eval: interrupted sleep Current Status: 70% improvement yes   4.  4. Patient to demonstrate no postural deformity upon rise. Status at last Eval: unable Current Status: min VCing  no     New Goals to be achieved in __2-4__  weeks:  1. Patient to be Safe and Independent with HEP to self-manage/prevent symptoms after DC.   2. Patient to increase FS FOTO score to > 62 to indicate increased functional independence. 3. Patient to demonstrate FTSS score of < 18\" to indicate reduced risk for falls. 4. Patient will report ability to stand > 10 minutes with <  3/10 LBP to facilitate cooking a meal.     RECOMMENDATIONS  Pt would benefit from continued therapy to address decreased core strength and intermittent left LE radiculopathy to facilitate improved standing and activity tolerance. If you have any questions/comments please contact us directly at [phone]. Thank you for allowing us to assist in the care of your patient. LPTA Signature: Radha Amezcua PTA  Date: 2/8/2022   PT Signature: Scot Scarce \"BJ\" Lizabeth Kocher, DPT, Cert. MDT, Cert. DN, Cert. SMT, Dip. Osteopractic Time: 12:33 PM   NOTE TO PHYSICIAN:  PLEASE COMPLETE THE ORDERS BELOW AND FAX TO   [facility] Physical Therapy: [phone]. If you are unable to process this request in 24 hours please contact our office: [phone].    ___ I have read the above report and request that my patient continue as recommended.   ___ I have read the above report and request that my patient continue therapy with the following changes/special instructions:_________________________________________________________   ___ I have read the above report and request that my patient be discharged from therapy.      Physician Signature:        Date:       Time:                                                              Holley Mendez MD

## 2022-02-09 ENCOUNTER — HOSPITAL ENCOUNTER (OUTPATIENT)
Dept: PHYSICAL THERAPY | Age: 67
Discharge: HOME | End: 2022-02-09
Payer: MEDICARE

## 2022-02-09 PROCEDURE — 97530 THERAPEUTIC ACTIVITIES: CPT

## 2022-02-09 PROCEDURE — 97116 GAIT TRAINING THERAPY: CPT

## 2022-02-09 PROCEDURE — 97110 THERAPEUTIC EXERCISES: CPT

## 2022-02-09 PROCEDURE — 97112 NEUROMUSCULAR REEDUCATION: CPT

## 2022-02-09 NOTE — PROGRESS NOTES
PHYSICAL THERAPY - DAILY TREATMENT NOTE    Patient Name: Ann Marie Yun        Date: 2022  : 1955   yes Patient  Verified  Visit #:   8    Insurance: Payor: [address] [place] [address] / Plan: [facility] MEDICARE COMPLETE / Product Type: Managed Care Medicare /      In time: 154 Out time: 428   Total Treatment Time: 56     Medicare/BCBS Time Tracking (below)   Total Timed Codes (min):  46 1:1 Treatment Time:  40     TREATMENT AREA =  Other low back pain [M54.59]    SUBJECTIVE  Pain Level (on 0 to 10 scale): 5/ 10    Medication Changes/New allergies or changes in medical history, any new surgeries or procedures?    no  If yes, update Summary List   Subjective Functional Status/Changes:  []  No changes reported     \"I was able to bend back at work., it helps\"       OBJECTIVE  Modalities Rationale:     decrease pain to improve patient's ability to safely perform ADLs, bending/stooping/ lifting; perform prolong sitting/standing/ambulation; and negotiate stairs with no pain or limitations    min [] Estim, type/location:                                      []  att     []  unatt     []  w/US     []  w/ice    []  w/heat    min []  Mechanical Traction: type/lbs                   []  pro   []  sup   []  int   []  cont    []  before manual    []  after manual    min []  Ultrasound, settings/location:      min []  Iontophoresis w/ dexamethasone, location:                                               []  take home patch       []  in clinic   10 min []  Ice     [x]  Heat    location/position: prone with wedge under B LEs   [x]Skin assessment post-treatment (if applicable):   [x]  intact    []  redness- no adverse reaction                  []redness  adverse reaction:      14 min Therapeutic Exercise:  [x]  See flow sheet   Rationale:      increase ROM, increase strength and improve coordination to improve the patients ability to safely perform ADLs, bending/stooping/ lifting; perform prolong sitting/standing/ambulation; and negotiate stairs with no pain or limitations        10 min Therapeutic Activity: [x]  See flow sheet   Rationale:    increase ROM, increase strength and improve coordination to improve the patients ability to  perform transfers, stair training, prolong amb and bed mobility without difficulty or pain    10 min Neuromuscular Re-ed: [x]  See flow sheet   Rationale:    increase ROM, increase strength and improve coordination to improve the patients ability to safely perform ADLs, bending/stooping/ lifting; perform prolong sitting/standing/ambulation; and negotiate stairs with no pain or limitations       12 min Gait Training: (I) with Front rack carry + HK x 60' with 5#  (I) with suitcase carry x 60' ea UE with 5#   (I) with SS x 30' x 2    Rationale:increase ROM, increase strength and improve coordination to improve the patients ability to safely perform prolong ambulation      Billed With/As:   [x] TE   [x] TA   [] Neuro   [] Self Care Patient Education: [x] Review HEP    [] Progressed/Changed HEP based on:   [x] positioning   [x] body mechanics   [x] transfers   [] heat/ice application    [x] other: Pt ed on importance and benefits of compliance with HEP, core strength/stability and proper posture; pt verbalized understanding     Other Objective/Functional Measures:    VCs + demo to perform proper technique for TE  initiated SB core stability #3 and increased to GTB with HL hip abd ;  no c/o pain noted at this time    c/o R glut med p! with R hip abd in standing, requested to stop at 5 reps   Post Treatment Pain Level (on 0 to 10) scale:  4/ 10     ASSESSMENT  Assessment/Changes in Function:   demos Right hip strength with PREs, advised to promote strengthening with HEP    []  See Progress Note/Recertification   Patient will continue to benefit from skilled PT services to modify and progress therapeutic interventions, address functional mobility deficits, address ROM deficits, address strength deficits, analyze and address soft tissue restrictions, analyze and cue movement patterns, analyze and modify body mechanics/ergonomics, assess and modify postural abnormalities and instruct in home and community integration to attain remaining goals.    Progress toward goals / Updated goals:  See PN last visit, no changes noted at this time       PLAN  [x]  Upgrade activities as tolerated yes Continue plan of care   []  Discharge due to :    []  Other:      Therapist: Lupe Huang PTA    Date: 2/9/2022 Time: 3:19 PM     Future Appointments   Date Time Provider Syed Brownlee   2/9/2022  3:30 PM Veto Cervantes [facility]   2/14/2022  3:30 PM Baltazar Rubin PTA [facility]   2/16/2022  3:30 PM Rajan Quintana [facility]   2/21/2022  3:30 PM Dave Elder, PT [facility]   2/23/2022  3:30 PM Dave Elder, PT [facility]   2/28/2022  3:30 PM Dave Elder, PT [facility]

## 2022-02-14 ENCOUNTER — HOSPITAL ENCOUNTER (OUTPATIENT)
Dept: PHYSICAL THERAPY | Age: 67
Discharge: HOME | End: 2022-02-14
Payer: MEDICARE

## 2022-02-14 PROCEDURE — 97112 NEUROMUSCULAR REEDUCATION: CPT

## 2022-02-14 PROCEDURE — 97116 GAIT TRAINING THERAPY: CPT

## 2022-02-14 PROCEDURE — 97110 THERAPEUTIC EXERCISES: CPT

## 2022-02-14 PROCEDURE — 97530 THERAPEUTIC ACTIVITIES: CPT

## 2022-02-14 NOTE — PROGRESS NOTES
PHYSICAL THERAPY - DAILY TREATMENT NOTE    Patient Name: Stephanie Guidry        Date: 2022  : 1955   yes Patient  Verified  Visit #:     Insurance: Payor: Mehreen Laurent / Plan: [facility] [employer] MEDICARE COMPLETE / Product Type: Managed Care Medicare /      In time: 335 Out time: 440   Total Treatment Time: 65     Medicare/BCBS Time Tracking (below)   Total Timed Codes (min):55 1:1 Treatment Time: 55     TREATMENT AREA =  Other low back pain [M54.59]    SUBJECTIVE  Pain Level (on 0 to 10 scale): 4/ 10    Medication Changes/New allergies or changes in medical history, any new surgeries or procedures?    no  If yes, update Summary List   Subjective Functional Status/Changes:  []  No changes reported     \"I feel good for a Monday.  I am working on my HEP\"       OBJECTIVE  Modalities Rationale:     decrease pain to improve patient's ability to safely perform ADLs, bending/stooping/ lifting; perform prolong sitting/standing/ambulation; and negotiate stairs with no pain or limitations    min [] Estim, type/location:                                      []  att     []  unatt     []  w/US     []  w/ice    []  w/heat    min []  Mechanical Traction: type/lbs                   []  pro   []  sup   []  int   []  cont    []  before manual    []  after manual    min []  Ultrasound, settings/location:      min []  Iontophoresis w/ dexamethasone, location:                                               []  take home patch       []  in clinic   10 min []  Ice     [x]  Heat    location/position: prone with wedge under B LEs   [x]Skin assessment post-treatment (if applicable):   [x]  intact    []  redness- no adverse reaction                  []redness  adverse reaction:       10 min Therapeutic Exercise:  [x]  See flow sheet   Rationale:      increase ROM, increase strength and improve coordination to improve the patients ability to safely perform ADLs, bending/stooping/ lifting; perform prolong sitting/standing/ambulation; and negotiate stairs with no pain or limitations        12 min Therapeutic Activity: [x]  See flow sheet   Rationale:    increase ROM, increase strength and improve coordination to improve the patients ability to  perform transfers, stair training, prolong amb and bed mobility without difficulty or pain    10 min Neuromuscular Re-ed: [x]  See flow sheet   Rationale:    increase ROM, increase strength and improve coordination to improve the patients ability to safely perform ADLs, bending/stooping/ lifting; perform prolong sitting/standing/ambulation; and negotiate stairs with no pain or limitations       13 min Gait Training: (I) with Front rack carry x 60' with 10#  (I) with Front rack carry + HK x 60' with 10#  (I) with suitcase carry x 60' ea UE with 10#   (I) with SS x 30' x 2    Rationale:increase ROM, increase strength and improve coordination to improve the patients ability to safely perform prolong ambulation      Billed With/As:   [x] TE   [x] TA   [] Neuro   [] Self Care Patient Education: [x] Review HEP    [] Progressed/Changed HEP based on:   [x] positioning   [x] body mechanics   [x] transfers   [] heat/ice application    [x] other: Pt ed on importance and benefits of compliance with HEP, core strength/stability and proper posture; pt verbalized understanding     Other Objective/Functional Measures:    VCs + demo to perform proper technique for TE  increased to 10# with weighted GT with no difficulty   increased to GTB from OTB for clams;  no c/o pain noted at this time     Post Treatment Pain Level (on 0 to 10) scale:  0/ 10     ASSESSMENT  Assessment/Changes in Function:   (I) with weighted GT   Progressed TE without c/o increase p!  demos fair bridge form with no p!    []  See Progress Note/Recertification   Patient will continue to benefit from skilled PT services to modify and progress therapeutic interventions, address functional mobility deficits, address ROM deficits, address strength deficits, analyze and address soft tissue restrictions, analyze and cue movement patterns, analyze and modify body mechanics/ergonomics, assess and modify postural abnormalities and instruct in home and community integration to attain remaining goals. Progress toward goals / Updated goals:  New Goals to be achieved in __2-4__  weeks:  1. Patient to be Safe and Independent with HEP to self-manage/prevent symptoms after DC. 2. Patient to increase FS FOTO score to > 62 to indicate increased functional independence. 3. Patient to demonstrate FTSS score of < 18\" to indicate reduced risk for falls.   4. Patient will report ability to stand > 10 minutes with <  3/10 LBP to facilitate cooking a meal. progressing, able to stand for TE x 20 mins     PLAN  [x]  Upgrade activities as tolerated yes Continue plan of care   []  Discharge due to :    []  Other:      Therapist: Marvin Decker PTA    Date: 2/14/2022 Time: 6:06 PM     Future Appointments   Date Time Provider Syed Brownlee   2/16/2022  3:30 PM Isma Richards [facility]   2/21/2022  3:30 PM Becky Marshall, PT [facility]   2/23/2022  3:30 PM Becky Marshall PT [facility]   2/28/2022  3:30 PM Becky Marshall PT [facility]

## 2022-02-16 ENCOUNTER — HOSPITAL ENCOUNTER (OUTPATIENT)
Dept: PHYSICAL THERAPY | Age: 67
Discharge: HOME | End: 2022-02-16
Payer: MEDICARE

## 2022-02-16 PROCEDURE — 97112 NEUROMUSCULAR REEDUCATION: CPT

## 2022-02-16 PROCEDURE — 97110 THERAPEUTIC EXERCISES: CPT

## 2022-02-16 PROCEDURE — 97116 GAIT TRAINING THERAPY: CPT

## 2022-02-16 PROCEDURE — 97530 THERAPEUTIC ACTIVITIES: CPT

## 2022-02-16 NOTE — PROGRESS NOTES
PHYSICAL THERAPY - DAILY TREATMENT NOTE    Patient Name: Edd Samayoa        Date: 2022  : 1955   yes Patient  Verified  Visit #:   10  of   16  Insurance: Payor: Radhika Arciniega / Plan: [facility] / Product Type: Managed Care Medicare /      In time:  335 Out time: 443   Total Treatment Time: 68     Medicare/BCBS Time Tracking (below)   Total Timed Codes (min): 58 1:1 Treatment Time: 50     TREATMENT AREA =  Other low back pain [M54.59]    SUBJECTIVE  Pain Level (on 0 to 10 scale):  0/ 10    Medication Changes/New allergies or changes in medical history, any new surgeries or procedures?    no  If yes, update Summary List   Subjective Functional Status/Changes:  []  No changes reported     \"I have had a long, hard day, and I have my 15 yr old's bday today\"       OBJECTIVE  Modalities Rationale:     decrease pain to improve patient's ability to safely perform ADLs, bending/stooping/ lifting; perform prolong sitting/standing/ambulation; and negotiate stairs with no pain or limitations    min [] Estim, type/location:                                      []  att     []  unatt     []  w/US     []  w/ice    []  w/heat    min []  Mechanical Traction: type/lbs                   []  pro   []  sup   []  int   []  cont    []  before manual    []  after manual    min []  Ultrasound, settings/location:      min []  Iontophoresis w/ dexamethasone, location:                                               []  take home patch       []  in clinic   10 min []  Ice     [x]  Heat    location/position: prone with wedge under B LEs   [x]Skin assessment post-treatment (if applicable):   [x]  intact    []  redness- no adverse reaction                  []redness  adverse reaction:        21 min Therapeutic Exercise:  [x]  See flow sheet   Rationale:      increase ROM, increase strength and improve coordination to improve the patients ability to safely perform ADLs, bending/stooping/ lifting; perform prolong sitting/standing/ambulation; and negotiate stairs with no pain or limitations     12 min Therapeutic Activity: [x]  See flow sheet   Rationale:    increase ROM, increase strength and improve coordination to improve the patients ability to  perform transfers, stair training, prolong amb and bed mobility without difficulty or pain    10 min Neuromuscular Re-ed: [x]  See flow sheet   Rationale:    increase ROM, increase strength and improve coordination to improve the patients ability to safely perform ADLs, bending/stooping/ lifting; perform prolong sitting/standing/ambulation; and negotiate stairs with no pain or limitations       15 min Gait Training: (I) with Front rack carry x 60' with 10#  (I) with Front rack carry + HK x 60' with 10#  (I) with suitcase carry x 60' ea UE with 10#   (I) with SS x 30' x 2    Rationale:increase ROM, increase strength and improve coordination to improve the patients ability to safely perform prolong ambulation      Billed With/As:   [x] TE   [x] TA   [] Neuro   [] Self Care Patient Education: [x] Review HEP    [] Progressed/Changed HEP based on:   [x] positioning   [x] body mechanics   [x] transfers   [] heat/ice application    [x] other: Pt ed on importance and benefits of compliance with HEP, core strength/stability and proper posture; pt verbalized understanding     Other Objective/Functional Measures:    VCs + demo to perform proper technique for TE  after completing HK amb with 10# ; pt c/o left lumbar discomfort  ART- >TERRY=reduced pain  REIL abolished pain   LTR Right increased RLE sxsl abolished with REIL       Post Treatment Pain Level (on 0 to 10) scale:  3/ 10     ASSESSMENT  Assessment/Changes in Function:   limited by produced RLE radic sxs, able to abolish with REIL, unable to maintain  demos compensated gait with fatigue, instructed to correct    []  See Progress Note/Recertification   Patient will continue to benefit from skilled PT services to modify and progress therapeutic interventions, address functional mobility deficits, address ROM deficits, address strength deficits, analyze and address soft tissue restrictions, analyze and cue movement patterns, analyze and modify body mechanics/ergonomics, assess and modify postural abnormalities and instruct in home and community integration to attain remaining goals. Progress toward goals / Updated goals:  New Goals to be achieved in __2-4__  weeks:  1. Patient to be Safe and Independent with HEP to self-manage/prevent symptoms after DC. 2. Patient to increase FS FOTO score to > 62 to indicate increased functional independence. 3. Patient to demonstrate FTSS score of < 18\" to indicate reduced risk for falls.   4. Patient will report ability to stand > 10 minutes with <  3/10 LBP to facilitate cooking a meal. progressing, able to stand for TE x 20 mins     PLAN  [x]  Upgrade activities as tolerated yes Continue plan of care   []  Discharge due to :    []  Other:      Therapist: Tim Pascual PTA    Date: 2/16/2022 Time: 11:26 AM     Future Appointments   Date Time Provider Syed Brownlee   2/16/2022  3:30 PM Arthor Section [facility]   2/21/2022  3:30 PM Diantha Police, PT [facility]   2/23/2022  3:30 PM Diantha Police, PT [facility]   2/28/2022  3:30 PM Diantha Police, PT [facility]

## 2022-02-21 ENCOUNTER — HOSPITAL ENCOUNTER (OUTPATIENT)
Dept: PHYSICAL THERAPY | Age: 67
Discharge: HOME | End: 2022-02-21
Payer: MEDICARE

## 2022-02-21 PROCEDURE — 97530 THERAPEUTIC ACTIVITIES: CPT

## 2022-02-21 PROCEDURE — 97112 NEUROMUSCULAR REEDUCATION: CPT

## 2022-02-21 PROCEDURE — 97116 GAIT TRAINING THERAPY: CPT

## 2022-02-21 PROCEDURE — 97110 THERAPEUTIC EXERCISES: CPT

## 2022-02-21 NOTE — PROGRESS NOTES
PHYSICAL THERAPY - DAILY TREATMENT NOTE    Patient Name: Edd Samayoa        Date: 2022  : 1955   YES Patient  Verified  Visit #:      16  Insurance: Payor: Radhika Demian / Plan: [facility] MEDICARE COMPLETE / Product Type: Managed Care Medicare /      In time: 3:20 Out time: 4:23   Total Treatment Time: 63     Medicare/BCBS Time Tracking (below)   Total Timed Codes (min):  63 1:1 Treatment Time:  53     TREATMENT AREA = Other low back pain [M54.59]    SUBJECTIVE    Pain Level (on 0 to 10 scale):  3  / 10   Medication Changes/New allergies or changes in medical history, any new surgeries or procedures? NO    If yes, update Summary List   Subjective Functional Status/Changes:  []  No changes reported     \"I was running around all weekend. My back isn't so bad as it is a little in my legs. \"          OBJECTIVE     Therapeutic Procedures:  Min Procedure Specifics + Rationale   n/a [x]  Patient Education (performed throughout session) [x] Review HEP    [] Progressed/Changed HEP based on:   [] proper performance and advancement of Therex/TA   [] reduction in pain level    [] increased functional capacity       [] change in directional preference   15 [x] Therapeutic Exercise   [x]  See Flowsheet   Rationale: increase ROM and increase strength to improve the patients ability to participate in ADL's    15 [x] Therapeutic Activity   [x]  See Flowsheet  Rationale:  To improve safety, proprioception, coordination, and efficiency with tasks   15 [x] Neuromuscular Re-ed   [x]  See Flowsheet  Rationale: increase ROM, increase strength, improve coordination, improve balance and increase proprioception  to improve the patients ability to safely participate in ADL's     8 [x]  Gait Training HK/Retro/SS  Rationale: Normalize gait, increase proprioceptive and kinesthetic awareness, coordination, balance       Modality rationale: decrease inflammation, decrease pain, increase tissue extensibility and increase muscle contraction/control to improve the patients ability to perform ADL's with greater ease     Min Type Additional Details   10 [x]  Heat         [] pre-ELIZA      [x] post-ELIZA Location:LS    [x] supine             [] prone     [x] legs elevated  [] legs flat  [] sitting              [] sidelying - [] left [] right   [x] Skin assessment post-treatment:  [x]intact [x]redness- no adverse reaction       []redness  adverse reaction:       Other Objective/Functional Measures:    therex revised per flow sheet     Post Treatment Pain Level (on 0 to 10) scale:   0  / 10     ASSESSMENT    Assessment/Changes in Function:       Able to alleviate and abolish despite recent exacerbation. Patient will continue to benefit from skilled PT services to modify and progress therapeutic interventions, address functional mobility deficits, address ROM deficits, address strength deficits, analyze and address soft tissue restrictions, analyze and cue movement patterns, analyze and modify body mechanics/ergonomics and instruct in home and community integration  to attain remaining goals   Progress toward goals / Updated goals:    Progressing to DC in 2-4 sessions     PLAN    [x]  Upgrade activities as tolerated  [x]  Update interventions per flow sheet YES Continue plan of care   []  Discharge due to :    []  Other:      Therapist: Valerie Diaz \"BJ\" Bladimir Lynch DPT, Cert. MDT, Cert. DN, Cert. SMT, Dip.  Osteopractic    Date: 2/21/2022 Time: 3:38 PM     Future Appointments   Date Time Provider Syed Brownlee   2/23/2022  3:30 PM Dave Elder PT [facility]   2/28/2022  3:30 PM Dave Elder PT [facility]

## 2022-02-23 ENCOUNTER — HOSPITAL ENCOUNTER (OUTPATIENT)
Dept: PHYSICAL THERAPY | Age: 67
Discharge: HOME | End: 2022-02-23
Payer: MEDICARE

## 2022-02-23 PROCEDURE — 97530 THERAPEUTIC ACTIVITIES: CPT

## 2022-02-23 PROCEDURE — 97110 THERAPEUTIC EXERCISES: CPT

## 2022-02-23 PROCEDURE — 97112 NEUROMUSCULAR REEDUCATION: CPT
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
## 2022-02-28 ENCOUNTER — HOSPITAL ENCOUNTER (OUTPATIENT)
Dept: PHYSICAL THERAPY | Age: 67
Discharge: HOME | End: 2022-02-28
Payer: MEDICARE

## 2022-02-28 PROCEDURE — 97530 THERAPEUTIC ACTIVITIES: CPT

## 2022-02-28 PROCEDURE — 97112 NEUROMUSCULAR REEDUCATION: CPT

## 2022-02-28 PROCEDURE — 97110 THERAPEUTIC EXERCISES: CPT

## 2022-02-28 NOTE — PROGRESS NOTES
PHYSICAL THERAPY - DAILY TREATMENT NOTE    Patient Name: Pretty Bro        Date: 2022  : 1955   YES Patient  Verified  Visit #:   15   of   15  Insurance: Payor: Marcelo Araujo / Plan: [employer] MEDICARE COMPLETE / Product Type: Managed Care Medicare /      In time: 3:37 Out time: 4:15   Total Treatment Time: 38     Medicare/BCBS Time Tracking (below)   Total Timed Codes (min):  38 1:1 Treatment Time:  38     TREATMENT AREA = Other low back pain [M54.59]    SUBJECTIVE    Pain Level (on 0 to 10 scale):  0-1  / 10   Medication Changes/New allergies or changes in medical history, any new surgeries or procedures? NO    If yes, update Summary List   Subjective Functional Status/Changes:  []  No changes reported     \"Sorry I'm late. I got caught behind that train. \"          OBJECTIVE     Therapeutic Procedures:  Min Procedure Specifics + Rationale   n/a [x]  Patient Education (performed throughout session) [x] Review HEP    [] Progressed/Changed HEP based on:   [] proper performance and advancement of Therex/TA   [] reduction in pain level    [] increased functional capacity       [] change in directional preference   15 [x] Therapeutic Exercise   [x]  See Flowsheet   Rationale: increase ROM and increase strength to improve the patients ability to participate in ADL's    15 [x] Therapeutic Activity   [x]  See Flowsheet    Rationale: To improve safety, proprioception, coordination, and efficiency with tasks   8 [x] Neuromuscular Re-ed   [x]  See Flowsheet    Rationale: increase ROM, increase strength, improve coordination, improve balance and increase proprioception  to improve the patients ability to safely participate in ADL's           Other Objective/Functional Measures:    Increased reps/sets/resistance per flow sheet. Reduced rest periods provided as patient able to proceed through treatment without report of increased fatigue or pain.       Post Treatment Pain Level (on 0 to 10) scale: 0  / 10     ASSESSMENT    Assessment/Changes in Function:       Performed/participated in treatment well without complaints , indicating (+) response to current course of treatment to further improve functional status. Patient will continue to benefit from skilled PT services to instruct in home and community integration  to attain remaining goals   Progress toward goals / Updated goals:    Progressing to DC in 1-2 sessions     PLAN    [x]  Upgrade activities as tolerated  [x]  Update interventions per flow sheet YES Continue plan of care   []  Discharge due to :    []  Other:      Therapist: Anthony Gill \"JESS\" Holley Bonner, DPT, Cert. MDT, Cert. DN, Cert. SMT, Dip.  Osteopractic    Date: 2/28/2022 Time: 3:48 PM     Future Appointments   Date Time Provider Syed Brownlee   3/2/2022  3:30 PM Alva Brumfield, PT [facility]   3/7/2022  3:30 PM Alva Brumfield PT [facility]

## 2022-03-02 ENCOUNTER — HOSPITAL ENCOUNTER (OUTPATIENT)
Dept: PHYSICAL THERAPY | Age: 67
Discharge: HOME | End: 2022-03-02
Payer: MEDICARE

## 2022-03-02 PROCEDURE — 97110 THERAPEUTIC EXERCISES: CPT

## 2022-03-02 PROCEDURE — 97112 NEUROMUSCULAR REEDUCATION: CPT

## 2022-03-02 PROCEDURE — 97530 THERAPEUTIC ACTIVITIES: CPT

## 2022-03-02 NOTE — PROGRESS NOTES
PHYSICAL THERAPY - DAILY TREATMENT NOTE    Patient Name: Juan Coleman        Date: 3/2/2022  : 1955   YES Patient  Verified  Visit #:   14   of   15  Insurance: Payor: Flaca Taylor / Plan: [employer] MEDICARE COMPLETE / Product Type: Managed Care Medicare /      In time: 3:25 Out time: 4:05   Total Treatment Time: 40     Medicare/BCBS Time Tracking (below)   Total Timed Codes (min):  40 1:1 Treatment Time:  40     TREATMENT AREA = Other low back pain [M54.59]    SUBJECTIVE    Pain Level (on 0 to 10 scale):  1-2  / 10   Medication Changes/New allergies or changes in medical history, any new surgeries or procedures? NO    If yes, update Summary List   Subjective Functional Status/Changes:  []  No changes reported     \"I don't want to say it's pain, but I feel something to my right butt. \"          OBJECTIVE     Therapeutic Procedures:  Min Procedure Specifics + Rationale   n/a [x]  Patient Education (performed throughout session) [x] Review HEP    [] Progressed/Changed HEP based on:   [] proper performance and advancement of Therex/TA   [] reduction in pain level    [] increased functional capacity       [] change in directional preference   15 [x] Therapeutic Exercise   [x]  See Flowsheet   Rationale: increase ROM and increase strength to improve the patients ability to participate in ADL's    n/a [x]  Gait Training       n/a  Rationale: Normalize gait, increase proprioceptive and kinesthetic awareness, coordination, balance   10 [x] Therapeutic Activity   [x]  See Flowsheet    Rationale:  To improve safety, proprioception, coordination, and efficiency with tasks   15 [x] Neuromuscular Re-ed   [x]  See Flowsheet  repeateed movements per MDT  Rationale: increase ROM, increase strength, improve coordination, improve balance and increase proprioception  to improve the patients ability to safely participate in ADL's         Other Objective/Functional Measures:    Initiated rx with MDT     Post Treatment Pain Level (on 0 to 10) scale:   0  / 10     ASSESSMENT    Assessment/Changes in Function:       Derangement able to reduce and abolish despite exacerbation         Patient will continue to benefit from skilled PT services to instruct in home and community integration  to attain remaining goals   Progress toward goals / Updated goals:    Progressing to DC NV     PLAN    [x]  Upgrade activities as tolerated  [x]  Update interventions per flow sheet YES Continue plan of care   []  Discharge due to :    []  Other:      Therapist: Bárbara Farrar \"BJ\" KAY Hinojosa, Cert. MDT, Cert. DN, Cert. SMT, Dip.  Osteopractic    Date: 3/2/2022 Time: 3:47 PM     Future Appointments   Date Time Provider Syed Brownlee   3/7/2022  3:30 PM Becky Marshall, PT [facility]

## 2022-03-07 ENCOUNTER — HOSPITAL ENCOUNTER (OUTPATIENT)
Dept: PHYSICAL THERAPY | Age: 67
Discharge: HOME | End: 2022-03-07
Payer: MEDICARE

## 2022-03-07 PROCEDURE — 97530 THERAPEUTIC ACTIVITIES: CPT

## 2022-03-07 PROCEDURE — 97110 THERAPEUTIC EXERCISES: CPT

## 2022-03-07 PROCEDURE — 97112 NEUROMUSCULAR REEDUCATION: CPT

## 2022-03-07 PROCEDURE — 97116 GAIT TRAINING THERAPY: CPT

## 2022-03-07 NOTE — PROGRESS NOTES
PHYSICAL THERAPY - DAILY TREATMENT NOTE    Patient Name: Jimbo Landaverde        Date: 3/7/2022  : 1955   YES Patient  Verified  Visit #:   15   of   15  Insurance: Payor: Larry Saldaña / Plan: [employer] MEDICARE COMPLETE / Product Type: Managed Care Medicare /      In time: 3:20 Out time: 4:13   Total Treatment Time: 53     Medicare/BCBS Time Tracking (below)   Total Timed Codes (min):  53 1:1 Treatment Time:  53     TREATMENT AREA = Other low back pain [M54.59]    SUBJECTIVE    Pain Level (on 0 to 10 scale):  0  / 10   Medication Changes/New allergies or changes in medical history, any new surgeries or procedures? NO    If yes, update Summary List   Subjective Functional Status/Changes:  []  No changes reported     \"I'm doing great and am ready to graduate. \"          OBJECTIVE     Therapeutic Procedures:  Min Procedure Specifics + Rationale   n/a [x]  Patient Education (performed throughout session) [x] Review HEP    [] Progressed/Changed HEP based on:   [] proper performance and advancement of Therex/TA   [] reduction in pain level    [] increased functional capacity       [] change in directional preference   15 [x] Therapeutic Exercise   [x]  See Flowsheet   Rationale: increase ROM and increase strength to improve the patients ability to participate in ADL's    15 [x] Therapeutic Activity   [x]  See Flowsheet  Re-assessment  Rationale:  To improve safety, proprioception, coordination, and efficiency with tasks   15 [x] Neuromuscular Re-ed   [x]  See Flowsheet    Rationale: increase ROM, increase strength, improve coordination, improve balance and increase proprioception  to improve the patients ability to safely participate in ADL's     8 [x]  Gait Training HK/Retro/SS  Rationale: Normalize gait, increase proprioceptive and kinesthetic awareness, coordination, balance          Other Objective/Functional Measures:    See DC     Post Treatment Pain Level (on 0 to 10) scale:   0  / 10 ASSESSMENT    Assessment/Changes in Function:       See DC         Patient will continue to benefit from skilled PT services to n/a  to attain remaining goals   Progress toward goals / Updated goals:    See DC     PLAN    [x]  Upgrade activities as tolerated  [x]  Update interventions per flow sheet NO Continue plan of care   []  Discharge due to :    []  Other:      Therapist: Earnestine Tello \"BJ\" TATIANA HinojosaT, Cert. MDT, Cert. DN, Cert. SMT, Dip.  Osteopractic    Date: 3/7/2022 Time: 3:29 PM     Future Appointments   Date Time Provider Syed Brownlee   3/7/2022  3:30 PM Luba Hall PT [facility]

## 2022-03-07 NOTE — PROGRESS NOTES
[facility] PHYSICAL THERAPY  [address] [place] [address] [address], [address] - Phone: [phone]  Fax: [phone]  DISCHARGE SUMMARY  Patient Name: Nola Ricardo : 1955   Treatment/Medical Diagnosis: Other low back pain [M54.59]   Referral Source: Swetha Valencia MD     Date of Initial Visit: 22 Attended Visits: 15 Missed Visits: 1     SUMMARY OF TREATMENT  Patient's POC has consisted of therex, therapeutic activities, manual therapy prn, modalities prn, pt. education, and a comprehensive HEP. Treatment strategies used to address functional mobility deficits, ROM deficits, strength deficits, analyze and address soft tissue restrictions, analyze and cue movement patterns, analyze and modify body mechanics/ergonomics, assess and modify postural abnormalities and instruct in home and community integration. CURRENT STATUS  Patient continued to make excellent progress, able to fully centralize and abolish symptom recurrence with her HEP. She denies any limitations to her ADL's caused by LBP. She has improved her FTSS score to 16\", indicating reduced risk for falls. GOALS/MEASURE OF PROGRESS Goal Met? 1. Patient to be Safe and Independent with HEP to self-manage/prevent symptoms after DC. 2. Patient to increase FS FOTO score to > 62 to indicate increased functional independence. 3. Patient to demonstrate FTSS score of < 18\" to indicate reduced risk for falls. 4. Patient will report ability to stand > 10 minutes with <  3/10 LBP to facilitate cooking a meal.     MET      MET      MEt      MET     RECOMMENDATIONS  Discontinue therapy. Progressing towards or have reached established goals. If you have any questions/comments please contact us directly at [phone]. Thank you for allowing us to assist in the care of your patient. Therapist Signature: Manuel Ojeda \"BJ\" Shekhar Barnard, TATIANAT, Cert. MDT, Cert. DN, Cert. SMT, Dip.  Osteopractic Date: 3/7/22   Reporting Period: n/a     Certification Period n/a Time: 3:30 PM     NOTE TO PHYSICIAN:  PLEASE COMPLETE THE ORDERS BELOW AND FAX TO   [facility] [place] [facility]: [phone]  If you are unable to process this request in 24 hours please contact our office: [phone]    ___ I have read the above report and request that my patient continue as recommended.   ___ I have read the above report and request that my patient continue therapy with the following changes/special instructions:_________________________________________________________   ___ I have read the above report and request that my patient be discharged from therapy.      Physician Signature:        Date:     Time: